# Patient Record
Sex: MALE
[De-identification: names, ages, dates, MRNs, and addresses within clinical notes are randomized per-mention and may not be internally consistent; named-entity substitution may affect disease eponyms.]

---

## 2024-02-07 PROBLEM — Z00.00 ENCOUNTER FOR PREVENTIVE HEALTH EXAMINATION: Status: ACTIVE | Noted: 2024-02-07

## 2024-02-08 ENCOUNTER — APPOINTMENT (OUTPATIENT)
Dept: PAIN MANAGEMENT | Facility: CLINIC | Age: 69
End: 2024-02-08
Payer: MEDICARE

## 2024-02-08 VITALS — HEIGHT: 74 IN | BODY MASS INDEX: 23.1 KG/M2 | WEIGHT: 180 LBS

## 2024-02-08 DIAGNOSIS — M51.36 OTHER INTERVERTEBRAL DISC DEGENERATION, LUMBAR REGION: ICD-10-CM

## 2024-02-08 DIAGNOSIS — M47.816 SPONDYLOSIS W/OUT MYELOPATHY OR RADICULOPATHY, LUMBAR REGION: ICD-10-CM

## 2024-02-08 DIAGNOSIS — M54.16 RADICULOPATHY, LUMBAR REGION: ICD-10-CM

## 2024-02-08 PROCEDURE — 99204 OFFICE O/P NEW MOD 45 MIN: CPT

## 2024-02-08 NOTE — ASSESSMENT
[FreeTextEntry1] : A discussion regarding available pain management treatment options occurred with the patient.  These included interventional, rehabilitative, pharmacological, and alternative modalities. We will proceed with the following:    Interventional treatment options:   - Patient is likely candidate for facet directed intervention versus BVN ablation for ongoing axial low back pain; informational materials provided for each -Will consider further pending review of MRI imaging - see additional instructions below    Rehabilitative options:   - initiate trial of physical therapy - participation in active HEP was discussed    Medication based treatment options:  - continue Tylenol 500-1000 mg up to TID as needed - see additional instructions below    Complementary treatment options:   - lifestyle modifications discussed   Additional treatment recommendations as follows:   - Obtain MRI lumbar spine - Follow-up in 6-8 weeks to assess response to rehabilitative care   The documentation recorded by the scribe, in my presence, accurately reflects the service I personally performed and the decisions made by me with my edits as appropriate.   I, Yann Koo acting as scribe, attest that this documentation has been prepared under the direction and in the presence of Provider Max Hi DO.

## 2024-02-08 NOTE — HISTORY OF PRESENT ILLNESS
[Lower back] : lower back [Left Leg] : left leg [Sudden] : sudden [6] : 6 [Dull/Aching] : dull/aching [Intermittent] : intermittent [Meds] : meds [Retired] : Work status: retired [FreeTextEntry1] : The patient presents for initial evaluation regarding their low back pain. Patient has been having on and off pain for about 6 months, he has constant pain in the left side of the lower back and intermittent radiation to the left buttocks and posterior left thigh; his pain is worse at night, and with prolonged standing. He has had 2 previous epidurals with an outside physician several years ago; he cannot quantify the amount of relief received; he takes Tylenol PRN for pain management.   Patient is quite active and walks daily as well as performs 200 sit ups per day.  Subjective weakness: No  Lower extremity paresthesias: No  Bladder/bowel dysfunction: No   Injections: 2x Lumbar Epidurals  Pertinent Surgical History: N/A   Imagin) MRI Lumbar Spine (2022) - [facility] L1-2: There is no significant posterior disc abnormality, spinal canal or foraminal stenosis. L2-3: There is a disc bulge and mild facet arthropathy mildly narrowing the neural foramina without significant central canal stenosis. L3-4: There is a disc bulge with a left central disc herniation resulting in mild spinal canal and mild left greater than right foraminal stenosis. L4-5: There is a disc bulge and facet arthropathy resulting in significant stenosis of the left lateral recess where there is impingement of the left L5 nerve root. There is mild-to-moderate central canal, moderate left and mild right foraminal stenosis. L5-S1: Disc osteophyte complex and facet arthropathy resulting in moderate bilateral foraminal stenosis. No significant central canal stenosis.  Physician Disclaimer: I have personally reviewed and confirmed all HPI data with the patient.  [] : no [FreeTextEntry7] : left leg [de-identified] : lumbar mri darcy mcdonough

## 2024-02-08 NOTE — PHYSICAL EXAM
[de-identified] : Constitutional:   - No acute distress   - Well developed; well nourished    Neurological:   - normal mood and affect   - alert and oriented x 3     Cardiovascular:   - grossly normal   Lumbar Spine Exam:   Inspection:  erythema (-)  ecchymosis (-)  rashes (-)  alignment: no scoliosis   Palpation:  Midline lumbar tenderness:            (-)  midline thoracic tenderness:          (-)  Lumbar paraspinal tenderness:  L (-) ; R (-)  thoracic paraspinal tenderness: L (-) ; R (-)  sciatic nerve tenderness :          L (-) ; R (-)  SI joint tenderness:                     L (-) ; R (-)  GTB tenderness:                        L (-);  R (-)   ROM: WNL  pain with extremes of extension = flexion  Strength:                                     Right       Left     Hip Flexion:                (5/5)       (5/5)  Quadriceps:               (5/5)       (5/5)  Hamstrings:                (5/5)       (5/5)  Ankle Dorsiflexion:     (5/5)       (5/5)  EHL:                           (5/5)       (5/5)  Ankle Plantarflexion:  (5/5)       (5/5)   Special Tests:  SLR:                            R (-) ; L (=)  Facet loading:             R (+) ; L (+)  ELLEN test:                R (-) ; L (-)  Hamstring tightness:   R (+);  L (+)   Neurologic:  SILT throughout right lower extremity  SILT throughout left lower extremity   Reflexes normal and symmetric bilateral lower extremities   Gait:  non- antalgic gait  ambulates without assistive device

## 2025-03-24 ENCOUNTER — OFFICE (OUTPATIENT)
Dept: URBAN - METROPOLITAN AREA CLINIC 100 | Facility: CLINIC | Age: 70
Setting detail: OPHTHALMOLOGY
End: 2025-03-24
Payer: MEDICARE

## 2025-03-24 DIAGNOSIS — H01.001: ICD-10-CM

## 2025-03-24 DIAGNOSIS — H01.004: ICD-10-CM

## 2025-03-24 DIAGNOSIS — H25.13: ICD-10-CM

## 2025-03-24 DIAGNOSIS — B88.0: ICD-10-CM

## 2025-03-24 PROCEDURE — 92004 COMPRE OPH EXAM NEW PT 1/>: CPT | Performed by: OPHTHALMOLOGY

## 2025-03-24 ASSESSMENT — KERATOMETRY
OS_K2POWER_DIOPTERS: 41.75
OS_K1POWER_DIOPTERS: 41.25
OD_AXISANGLE_DEGREES: 053
OD_K1POWER_DIOPTERS: 41.00
OS_AXISANGLE_DEGREES: 087
METHOD_AUTO_MANUAL: AUTO
OD_K2POWER_DIOPTERS: 41.25

## 2025-03-24 ASSESSMENT — REFRACTION_CURRENTRX
OS_AXIS: 000
OD_OVR_VA: 20/
OS_CYLINDER: SPHERE
OS_VPRISM_DIRECTION: PROGS
OD_AXIS: 089
OS_OVR_VA: 20/
OD_ADD: +2.25
OD_CYLINDER: -0.25
OD_VPRISM_DIRECTION: PROGS
OS_ADD: +2.25
OS_SPHERE: +1.50
OD_SPHERE: +1.75

## 2025-03-24 ASSESSMENT — CONFRONTATIONAL VISUAL FIELD TEST (CVF)
OD_FINDINGS: FULL
OS_FINDINGS: FULL

## 2025-03-24 ASSESSMENT — REFRACTION_MANIFEST
OS_VA1: 20/30
OD_AXIS: 130
OS_AXIS: 015
OD_CYLINDER: -0.50
OD_SPHERE: +1.75
OD_VA1: 20/NI
OS_SPHERE: +1.00
OS_CYLINDER: -0.25

## 2025-03-24 ASSESSMENT — TONOMETRY
OD_IOP_MMHG: 17
OS_IOP_MMHG: 18

## 2025-03-24 ASSESSMENT — REFRACTION_AUTOREFRACTION
OD_SPHERE: +1.75
OS_CYLINDER: -0.25
OS_SPHERE: +1.00
OD_AXIS: 127
OS_AXIS: 016
OD_CYLINDER: -0.50

## 2025-03-24 ASSESSMENT — VISUAL ACUITY
OD_BCVA: 20/30-2
OS_BCVA: 20/20

## 2025-03-24 ASSESSMENT — LID EXAM ASSESSMENTS
OS_COMMENTS: COLLARETTES
OS_BLEPHARITIS: LUL 2+
OD_BLEPHARITIS: RUL 2+
OD_COMMENTS: COLLARETTES